# Patient Record
Sex: MALE | Race: WHITE | Employment: FULL TIME | URBAN - METROPOLITAN AREA
[De-identification: names, ages, dates, MRNs, and addresses within clinical notes are randomized per-mention and may not be internally consistent; named-entity substitution may affect disease eponyms.]

---

## 2018-11-04 ENCOUNTER — HOSPITAL ENCOUNTER (EMERGENCY)
Facility: CLINIC | Age: 58
Discharge: HOME | End: 2018-11-04
Attending: FAMILY MEDICINE | Admitting: FAMILY MEDICINE
Payer: COMMERCIAL

## 2018-11-04 ENCOUNTER — APPOINTMENT (OUTPATIENT)
Dept: GENERAL RADIOLOGY | Facility: CLINIC | Age: 58
End: 2018-11-04
Attending: FAMILY MEDICINE
Payer: COMMERCIAL

## 2018-11-04 VITALS
TEMPERATURE: 99.9 F | HEART RATE: 120 BPM | DIASTOLIC BLOOD PRESSURE: 99 MMHG | OXYGEN SATURATION: 95 % | SYSTOLIC BLOOD PRESSURE: 172 MMHG | HEIGHT: 67 IN | RESPIRATION RATE: 18 BRPM
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
## 2018-11-04 DIAGNOSIS — S43.004A DISLOCATION OF RIGHT SHOULDER JOINT, INITIAL ENCOUNTER: ICD-10-CM

## 2018-11-04 LAB
ANION GAP SERPL CALCULATED.3IONS-SCNC: 9 MMOL/L (ref 3–14)
BASOPHILS # BLD AUTO: 0 10E9/L (ref 0–0.2)
BASOPHILS NFR BLD AUTO: 0.4 %
BUN SERPL-MCNC: 17 MG/DL (ref 7–30)
CALCIUM SERPL-MCNC: 8.5 MG/DL (ref 8.5–10.1)
CHLORIDE SERPL-SCNC: 107 MMOL/L (ref 94–109)
CO2 SERPL-SCNC: 26 MMOL/L (ref 20–32)
CREAT SERPL-MCNC: 1.01 MG/DL (ref 0.66–1.25)
DIFFERENTIAL METHOD BLD: NORMAL
EOSINOPHIL # BLD AUTO: 0.2 10E9/L (ref 0–0.7)
EOSINOPHIL NFR BLD AUTO: 1.9 %
ERYTHROCYTE [DISTWIDTH] IN BLOOD BY AUTOMATED COUNT: 12.8 % (ref 10–15)
GFR SERPL CREATININE-BSD FRML MDRD: 76 ML/MIN/1.7M2
GLUCOSE SERPL-MCNC: 93 MG/DL (ref 70–99)
HCT VFR BLD AUTO: 47 % (ref 40–53)
HGB BLD-MCNC: 15.4 G/DL (ref 13.3–17.7)
IMM GRANULOCYTES # BLD: 0 10E9/L (ref 0–0.4)
IMM GRANULOCYTES NFR BLD: 0.4 %
LYMPHOCYTES # BLD AUTO: 2 10E9/L (ref 0.8–5.3)
LYMPHOCYTES NFR BLD AUTO: 24.3 %
MCH RBC QN AUTO: 31 PG (ref 26.5–33)
MCHC RBC AUTO-ENTMCNC: 32.8 G/DL (ref 31.5–36.5)
MCV RBC AUTO: 95 FL (ref 78–100)
MONOCYTES # BLD AUTO: 0.8 10E9/L (ref 0–1.3)
MONOCYTES NFR BLD AUTO: 9.1 %
NEUTROPHILS # BLD AUTO: 5.3 10E9/L (ref 1.6–8.3)
NEUTROPHILS NFR BLD AUTO: 63.9 %
NRBC # BLD AUTO: 0 10*3/UL
NRBC BLD AUTO-RTO: 0 /100
PLATELET # BLD AUTO: 173 10E9/L (ref 150–450)
POTASSIUM SERPL-SCNC: 3.7 MMOL/L (ref 3.4–5.3)
RBC # BLD AUTO: 4.97 10E12/L (ref 4.4–5.9)
SODIUM SERPL-SCNC: 142 MMOL/L (ref 133–144)
WBC # BLD AUTO: 8.3 10E9/L (ref 4–11)

## 2018-11-04 PROCEDURE — 99285 EMERGENCY DEPT VISIT HI MDM: CPT | Mod: 25 | Performed by: FAMILY MEDICINE

## 2018-11-04 PROCEDURE — 96374 THER/PROPH/DIAG INJ IV PUSH: CPT | Performed by: FAMILY MEDICINE

## 2018-11-04 PROCEDURE — 96375 TX/PRO/DX INJ NEW DRUG ADDON: CPT | Performed by: FAMILY MEDICINE

## 2018-11-04 PROCEDURE — 25000128 H RX IP 250 OP 636: Performed by: FAMILY MEDICINE

## 2018-11-04 PROCEDURE — 73060 X-RAY EXAM OF HUMERUS: CPT | Mod: RT

## 2018-11-04 PROCEDURE — 23650 CLTX SHO DSLC W/MNPJ WO ANES: CPT | Mod: RT | Performed by: FAMILY MEDICINE

## 2018-11-04 PROCEDURE — 80048 BASIC METABOLIC PNL TOTAL CA: CPT | Performed by: FAMILY MEDICINE

## 2018-11-04 PROCEDURE — 71045 X-RAY EXAM CHEST 1 VIEW: CPT

## 2018-11-04 PROCEDURE — 85025 COMPLETE CBC W/AUTO DIFF WBC: CPT | Performed by: FAMILY MEDICINE

## 2018-11-04 PROCEDURE — 73030 X-RAY EXAM OF SHOULDER: CPT | Mod: LT

## 2018-11-04 RX ORDER — HYDROMORPHONE HYDROCHLORIDE 1 MG/ML
0.5 INJECTION, SOLUTION INTRAMUSCULAR; INTRAVENOUS; SUBCUTANEOUS
Status: DISCONTINUED | OUTPATIENT
Start: 2018-11-04 | End: 2018-11-04 | Stop reason: HOSPADM

## 2018-11-04 RX ORDER — KETOROLAC TROMETHAMINE 15 MG/ML
15 INJECTION, SOLUTION INTRAMUSCULAR; INTRAVENOUS ONCE
Status: COMPLETED | OUTPATIENT
Start: 2018-11-04 | End: 2018-11-04

## 2018-11-04 RX ADMIN — KETOROLAC TROMETHAMINE 15 MG: 15 INJECTION, SOLUTION INTRAMUSCULAR; INTRAVENOUS at 18:33

## 2018-11-04 RX ADMIN — Medication 0.5 MG: at 18:28

## 2018-11-04 ASSESSMENT — ENCOUNTER SYMPTOMS
CHILLS: 0
HEADACHES: 0
DIARRHEA: 0
WHEEZING: 0
FEVER: 0
ABDOMINAL PAIN: 0
COUGH: 0
DIAPHORESIS: 0
VOMITING: 0
CONSTIPATION: 0
DYSURIA: 0
PALPITATIONS: 0
SHORTNESS OF BREATH: 0
SINUS PRESSURE: 0
SORE THROAT: 0
BLOOD IN STOOL: 0
FREQUENCY: 0
NAUSEA: 0

## 2018-11-04 NOTE — ED AVS SNAPSHOT
[facility] Emergency Department    [address] [address]    Phone:  [phone]    Fax:  [phone]                                       Jaclyn Arango   MRN: [id number]    Department:  [facility] Emergency Department   Date of Visit:  11/4/2018           After Visit Summary Signature Page     I have received my discharge instructions, and my questions have been answered. I have discussed any challenges I see with this plan with the nurse or doctor.    ..........................................................................................................................................  Patient/Patient Representative Signature      ..........................................................................................................................................  Patient Representative Print Name and Relationship to Patient    ..................................................               ................................................  Date                                   Time    ..........................................................................................................................................  Reviewed by Signature/Title    ...................................................              ..............................................  Date                                               Time          22EPIC Rev 08/18

## 2018-11-04 NOTE — ED AVS SNAPSHOT
[facility] Emergency Department    [address] [address]    Phone:  [phone]    Fax:  [phone]                                       Jaclyn Arango   MRN: [id number]    Department:  [facility] Emergency Department   Date of Visit:  11/4/2018           Patient Information     Date Of Birth          1960        Your diagnoses for this visit were:     Fall from ladder, initial encounter     Dislocation of right shoulder joint, initial encounter shoulder immobilizer for at least 3-4 days, and no more than a week.  See sports med in follow-up. pendulum exercises. return for numb, cold, immobile hand/arm       You were seen by Yazan Gonzalez MD.      Follow-up Information     Follow up with Ciaran Yousif MD In 2 weeks.    Specialty:  Family Practice    Why:  As needed    Contact information:    [facility]  [address] [address]  [phone]          Follow up with sports med In 10 days.        Follow up with [facility] Emergency Department.    Specialty:  EMERGENCY MEDICINE    Why:  As needed, If symptoms worsen    Contact information:    [address] [place] [id number] [phone]    Additional information:    The medical center is located at   [address] [place] [address]. (between [address]   [address] in [place], [place] miles north   of [place]).        Discharge Instructions         ICD-10-CM    1. Fall from ladder, initial encounter W11.XXXA ORTHO  REFERRAL   2. Dislocation of right shoulder joint, initial encounter S43.004A ORTHO  REFERRAL     [facility]    shoulder immobilizer for at least 3-4 days, and no more than a week.  See sports med in follow-up. pendulum exercises. return for numb, cold, immobile hand/arm         Dislocation: Shoulder (Reduced)    A shoulder is dislocated when a strong force injures, and possibly tears the ligaments that  hold the shoulder joint together. The bones that make up the joint then move apart and become stuck out of place. The joint must be put back in place. Then it will take several weeks for the shoulder to heal. This injury may weaken the ligaments. Weakened ligaments put you at risk for another dislocation. Another dislocation can happen even if you are hit with less force.  Shoulder dislocation is treated with a special type of arm sling called a shoulder immobilizer. This keeps your arm close to your body. This stops the shoulder from dislocating again while the ligaments heal. After a few weeks, you may start an exercise program. This will gradually bring back your range-of-motion and shoulder strength. It will also lower your risk for another dislocation.  Home care  Follow these tips for taking care of yourself at home:    Until your next doctor visit, wear your shoulder immobilizer at all times. Don t take it off at night to sleep. This is because it s possible to dislocate your arm again in your sleep. You can take it off to bathe or dress. But don t move your arm away from your body. Keep your arm in the same position that the sling was holding it in until you put the sling back on. During your next visit, ask your doctor how long you should wear the sling.    Apply an ice pack over the injured area for 20 minutes every 1 to 2 hours the first day. You can make an ice pack by putting ice cubes in a plastic bag. Wrap the bag in a towel before putting it on your shoulder. Continue with ice packs 3 to 4 times a day for the next 2 to 3 days. Then use the ice as needed to relieve pain and swelling.    You may use acetaminophen or ibuprofen to control pain, unless another pain medicine was prescribed. If you have chronic liver or kidney disease or ever had a stomach ulcer or gastrointestinal bleeding, talk with your doctor before using these medicines.    Don t take part in sports or physical education classes until  your doctor says it s OK.  Follow-up care  Follow up with your healthcare provider, or as advised. You shouldn t wear your shoulder immobilizer or sling for more than a few weeks without taking it off. Keeping it on for a longer time may limit your range-of-motion at the shoulder joint. If you have had several dislocations of the same shoulder, you may have permanent damage to the ligaments. Ask an orthopedic doctor about surgery to prevent another dislocation.  When to seek medical advice  Call your healthcare provider right away if any of these occur:    Another dislocation of your shoulder    Swelling or pain in the shoulder or arm that gets worse    Your fingers become cold, blue, numb or tingly    Fever or chills  Date Last Reviewed: 8/2/2016 2000-2017 [employer]. [address] [place] [address]. All rights reserved. This information is not intended as a substitute for professional medical care. Always follow your healthcare professional's instructions.          24 Hour Appointment Hotline       To make an appointment at any [facility] clinic, call [phone] ([phone]). If you don't have a family doctor or clinic, we will help you find one. [facility] clinics are conveniently located to serve the needs of you and your family.          ED Discharge Orders     [facility] REFERRAL       Physical Therapy, Hand Therapy and Chiropractic Care are available through:  *[facility]  *Hand Therapy (Occupational Therapy or Physical Therapy)  *[facility]    Call one number to schedule at any of the above locations: [phone].    Physical therapy, Hand therapy and/or Chiropractic care has been recommended by your physician as an excellent treatment option to reduce pain and help people return to normal activities, including sports.  Therapy and/or chiropractic care services are a great complement or alternative to  expensive and invasive surgery, injections, or long-term use of prescription medications. The primary goal is to identify the underlying problem and provide you the tools to manage your condition on your own.     Please be aware that coverage of these services is subject to the terms and limitations of your health insurance plan.  Call member services at your health plan with any benefit or coverage questions.      Please bring the following to your appointment:  *Your personal calendar for scheduling future appointments  *Comfortable clothing            ORTHO  REFERRAL       [facility] is referring you to the Orthopedic  Services at [facility].       The  Representative will assist you in the coordination of your Orthopedic and Musculoskeletal Care as prescribed by your physician.    The  Representative will call you within 1 business day to help schedule your appointment, or you may contact the  Representative at:    All areas ~ [phone]     Type of Referral : Non Surgical / Sport Medicine       Timeframe requested: Within 2 weeks    Coverage of these services is subject to the terms and limitations of your health insurance plan.  Please call member services at your health plan with any benefit or coverage questions.      If X-rays, CT or MRI's have been performed, please contact the facility where they were done to arrange for , prior to your scheduled appointment.  Please bring this referral request to your appointment and present it to your specialist.                     Review of your medicines      Notice     You have not been prescribed any medications.            Procedures and tests performed during your visit     Basic metabolic panel    CBC with platelets, differential    Humerus XR, G/E 2 views, right    Shoulder XR, 2 view left    XR Chest 1 View      Orders Needing Specimen Collection     None       Pending Results     No orders found from 11/2/2018 to 11/5/2018.            Pending Culture Results     No orders found from 11/2/2018 to 11/5/2018.            Pending Results Instructions     If you had any lab results that were not finalized at the time of your Discharge, you can call the ED Lab Result RN at [phone]. You will be contacted by this team for any positive Lab results or changes in treatment. The nurses are available 7 days a week from 10A to 6:30P.  You can leave a message 24 hours per day and they will return your call.        Test Results From Your Hospital Stay        11/4/2018  6:43 PM      Component Results     Component Value Ref Range & Units Status    WBC 8.3 4.0 - 11.0 10e9/L Final    RBC Count 4.97 4.4 - 5.9 10e12/L Final    Hemoglobin 15.4 13.3 - 17.7 g/dL Final    Hematocrit 47.0 40.0 - 53.0 % Final    MCV 95 78 - 100 fl Final    MCH 31.0 26.5 - 33.0 pg Final    MCHC 32.8 31.5 - 36.5 g/dL Final    RDW 12.8 10.0 - 15.0 % Final    Platelet Count 173 150 - 450 10e9/L Final    Diff Method Automated Method  Final    % Neutrophils 63.9 % Final    % Lymphocytes 24.3 % Final    % Monocytes 9.1 % Final    % Eosinophils 1.9 % Final    % Basophils 0.4 % Final    % Immature Granulocytes 0.4 % Final    Nucleated RBCs 0 0 /100 Final    Absolute Neutrophil 5.3 1.6 - 8.3 10e9/L Final    Absolute Lymphocytes 2.0 0.8 - 5.3 10e9/L Final    Absolute Monocytes 0.8 0.0 - 1.3 10e9/L Final    Absolute Eosinophils 0.2 0.0 - 0.7 10e9/L Final    Absolute Basophils 0.0 0.0 - 0.2 10e9/L Final    Abs Immature Granulocytes 0.0 0 - 0.4 10e9/L Final    Absolute Nucleated RBC 0.0  Final         11/4/2018  7:03 PM      Component Results     Component Value Ref Range & Units Status    Sodium 142 133 - 144 mmol/L Final    Potassium 3.7 3.4 - 5.3 mmol/L Final    Chloride 107 94 - 109 mmol/L Final    Carbon Dioxide 26 20 - 32 mmol/L Final    Anion Gap 9 3 - 14 mmol/L Final    Glucose 93 70 - 99 mg/dL Final    Urea  Nitrogen 17 7 - 30 mg/dL Final    Creatinine 1.01 0.66 - 1.25 mg/dL Final    GFR Estimate 76 >60 mL/min/1.7m2 Final    Non  GFR Calc    GFR Estimate If Black >90 >60 mL/min/1.7m2 Final    African American GFR Calc    Calcium 8.5 8.5 - 10.1 mg/dL Final         11/4/2018  7:17 PM      Narrative     HUMERUS RIGHT TWO OR MORE VIEWS   11/4/2018 7:00 PM     HISTORY: Proximal humerus fracture when fell from deer stand.     COMPARISON: None.        Impression     IMPRESSION: The humerus is anteriorly and inferiorly subluxed with  respect to the glenoid. There appears to be a Hill-Sachs deformity of  the humeral head. Recommend repeat imaging following relocation of the  shoulder.    PERLA GERMAN MD               11/4/2018  7:17 PM      Narrative     CHEST ONE VIEW   11/4/2018 7:00 PM     HISTORY: Fall from deer stand.     COMPARISON: None.        Impression     IMPRESSION: Cardiomediastinal silhouette within normal limits. No  focal airspace opacities. No pleural effusion. No pneumothorax  identified. The bones are unremarkable.     PERLA GERMAN MD         11/4/2018  7:21 PM      Narrative     SHOULDER TWO VIEW LEFT   11/4/2018 7:13 PM     HISTORY: Right shoulder dislocation, reduced.     COMPARISON: None.        Impression     IMPRESSION: No fracture is visualized on the provided two views.  Humeral head appears normally aligned with the glenoid. No significant  degenerative changes appreciated.    PERLA GERMAN MD                Thank you for choosing [facility]       Thank you for choosing [facility] for your care. Our goal is always to provide you with excellent care. Hearing back from our patients is one way we can continue to improve our services. Please take a few minutes to complete the written survey that you may receive in the mail after you visit with us. Thank you!        [facility] Information     [employer] lets you send messages to your doctor, view your test results, renew your prescriptions,  "schedule appointments and more. To sign up, go to [email or web address] . Click on \"Log in\" on the left side of the screen, which will take you to the Welcome page. Then click on \"Sign up Now\" on the right side of the page.     You will be asked to enter the access code listed below, as well as some personal information. Please follow the directions to create your username and password.     Your access code is: [email or web address]  Expires: 2019  7:46 PM     Your access code will  in 90 days. If you need help or a new code, please call your [facility] clinic or [phone].        Care EveryWhere ID     This is your Care EveryWhere ID. This could be used by other organizations to access your [facility] medical records  [id number]        Equal Access to Services     NASIR NEIL : Yvrose Guerrero, tiburcio lorenzo, mikey zambrano, reggie flannery . So [facility] [phone].    ATENCIÓN: Si habla español, tiene a funk disposición servicios gratuitos de asistencia lingüística. Llame al [phone].    We comply with applicable federal civil rights laws and [place] laws. We do not discriminate on the basis of race, color, national origin, age, disability, sex, sexual orientation, or gender identity.            After Visit Summary       This is your record. Keep this with you and show to your community pharmacist(s) and doctor(s) at your next visit.                  "

## 2018-11-05 NOTE — ED NOTES
Patient states he fell out of a deer stand about 5:00 this evening. The fall was 12-15 feet. He states he landed on his right shoulder. There is a bit of a deformity at the acromion process. He is in pain, there is a radial pulse but the lower half of his arm is cold to touch. This patient is a trauma eval and Dr Gonzalez has been notified. Patient did walk into ED on his own accord.

## 2018-11-05 NOTE — ED PROVIDER NOTES
"  History     Chief Complaint   Patient presents with     Fall     fell out of a deer stand approximetly 12 to 15 feet. c.o right shoulder pain. sore all over     HPI  Jaclyn Arango is a 58 year old male who presents after a fall from deer stand as he was descending approximately 12-15 feet up while stepping down the ladder from the stand.  He notes that he slipped and was able to grab on with the left hand to help break his fall he then landed primarily on his right shoulder.  He notes that he \"saw stars\" -but denies significant head injury and no loss of consciousness.  No neck pain and no pain on range of motion of the neck.  All of his pain is at the right humerus region proximally.  He has no sensory change distally.  There was no bleeding from any site.  He denied other injury and was able to ambulate after this.  No pelvis or hip pain.        History of hypertension, hyperlipidemia    Meds  hctz  aspirin  statin    He uses chewing tobacco.  Occasional alcohol none today.    Problem List:    There are no active problems to display for this patient.       Past Medical History:    No past medical history on file.    Past Surgical History:    No past surgical history on file.    Family History:    No family history on file.    Social History:  Marital Status:  Single [1]  Social History   Substance Use Topics     Smoking status: Not on file     Smokeless tobacco: Not on file     Alcohol use Not on file        Medications:      No current outpatient prescriptions on file.      Review of Systems   Constitutional: Negative for chills, diaphoresis and fever.   HENT: Negative for ear pain, sinus pressure and sore throat.    Eyes: Negative for visual disturbance.   Respiratory: Negative for cough, shortness of breath and wheezing.    Cardiovascular: Negative for chest pain and palpitations.   Gastrointestinal: Negative for abdominal pain, blood in stool, constipation, diarrhea, nausea and vomiting.   Genitourinary: " "Negative for dysuria, frequency and urgency.   Skin: Negative for rash.   Neurological: Negative for headaches.   All other systems reviewed and are negative.         Physical Exam   BP: (!) 172/99  Pulse: 120  Temp: 99.9  F (37.7  C)  Resp: 18  Height: 170.2 cm (5' 7\")  SpO2: 94 %      Physical Exam     Examination:  BP (!) 172/99  Pulse 120  Temp 99.9  F (37.7  C) (Temporal)  Resp 18  Ht 1.702 m (5' 7\")  SpO2 94%   Primary Survey:    Airway: Intact, Patent and Talking    Breathing: Spontaneous, Bilateral breath sounds and Non labored    Circulation: Awake and alert with normal blood pressure and normal central and peripheral perfusion     Disability:     Pupils: EOMI PERRL      GCS:   Motor 6=Obeys commands   Verbal 5=Oriented   Eye Opening 4=Spontaneous   Total: 15          Secondary Survey:    Neurologic: Alert, oriented, and coherent., Motor strength intact in the upper and lower extremities on manual muscle testing. and Sensation intact in the upper and lower extremities    HEENT     Eyes: PERRL, EOMI, lids, lashes, conjunctivae and corneas normal     Head: Atraumatic normocephalic, no areas of erythema, ecchymosis, swelling, deformity or other injury     Ears: Pinnas normal. EACs clear.  TMs normal. No hemotympanum otorrhea     Nose/Sinus: No external injury. No pain or instability on palpation. Nares normal. Septum midline.      Throat/Oropharynx: Lips, tongue, and buccal mucosa intact without evidence of injury. , Teeth intact, Posterior pharynx clear. and Jaw motion normal and without trismus or jaw tendersness. No malocclusion.     Face: No areas of  erythema, ecchymosis, swelling, or deformity.    Neck/C-Spine: Able to focus attention on neck, Full, pain free active range of motion of neck and No tenderness to palpation or percussion over the midline cervical spine    Chest: External Exam - No areas of  erythema, ecchymosis, swelling, or deformity, crepitus or subcutaneous emphysema       Pulmonary: " Breathing unlabored. Breath sounds clear bilaterally with good air entry and no retractions, tachypnea, or adventitious sounds.    Cardiovascular     Heart: Rhythm regular, rate normal, no murmur     Pulses: Bilateral radial normal    Gastrointestinal:     Abdomen: Non-distended, bowel sounds active, soft, non-tender, no hepatosplenomegaly or masses. No areas of  abrasion, laceration, or ecchymosis.     Rectal: Not examined    Genitourinary: Not examined    Musculoskeletal:      Back:  No areas of  erythema, ecchymosis, swelling, or deformity. and No midline tenderness to palpation or percussion over the length of the spine     Extremities:   The right shoulder appears to have a deformity at the proximal humerus that may be a humeral fracture.   Otherwise Full pain-free range of motion of joints of extremties, No areas of  erythema, ecchymosis, swelling, laceration or deformity.         C-spine clearance: C-spine clear by Nexus Criteria (No posterior midline tenderness, No intoxication, Normal alertness, No neuro deficit, No significant distracting injury (able to focus attention on neck))  - This was reevaluated after shoulder was reduced and he maintained normal range of motion of the cervical spine with no midline pain.    GCS prior to Discharge:    Motor 6=Obeys commands   Verbal 5=Oriented   Eye Opening 4=Spontaneous   Total: 15         There was initial concern regarding the right arm.  I was called to urgently evaluate him as it was thought his right arm was cool than the left.  However he has intact distal motor function  with radial ulnar and median nerve function intact.  Normal radial pulse and normal Jorgito's test for ulnar circulation.  Normal distal capillary refill and normal distal sensation.      Initial impression was that the patient had a proximal humerus fracture but once x-ray was obtained was found that he was dislocated at the right shoulder.      ED Course     ED Course     Orthopedic injury  tx  Date/Time: 11/4/2018 10:21 PM  Performed by: MARKELL YEPEZ  Authorized by: MARKELL YEPEZ   Consent: Verbal consent obtained.  Consent given by: patient  Patient understanding: patient states understanding of the procedure being performed  Patient identity confirmed: verbally with patient  Injury location: shoulder  Location details: right shoulder  Injury type: dislocation  Dislocation type: anterior  Hill-Sachs deformity: no  Chronicity: new  Pre-procedure neurovascular assessment: neurovascularly intact  Pre-procedure distal perfusion: normal  Pre-procedure neurological function: normal  Pre-procedure range of motion: normal    Anesthesia:  Local anesthesia used: no    Sedation:  Patient sedated: no  Manipulation performed: yes  Reduction method: davos.  Reduction successful: yes  X-ray confirmed reduction: yes  Immobilization: brace  Post-procedure neurovascular assessment: post-procedure neurovascularly intact  Post-procedure distal perfusion: normal  Post-procedure neurological function: normal  Post-procedure range of motion: normal  Patient tolerance: Patient tolerated the procedure well with no immediate complications                           Critical Care time:  none               Results for orders placed or performed during the hospital encounter of 11/04/18 (from the past 24 hour(s))   CBC with platelets, differential   Result Value Ref Range    WBC 8.3 4.0 - 11.0 10e9/L    RBC Count 4.97 4.4 - 5.9 10e12/L    Hemoglobin 15.4 13.3 - 17.7 g/dL    Hematocrit 47.0 40.0 - 53.0 %    MCV 95 78 - 100 fl    MCH 31.0 26.5 - 33.0 pg    MCHC 32.8 31.5 - 36.5 g/dL    RDW 12.8 10.0 - 15.0 %    Platelet Count 173 150 - 450 10e9/L    Diff Method Automated Method     % Neutrophils 63.9 %    % Lymphocytes 24.3 %    % Monocytes 9.1 %    % Eosinophils 1.9 %    % Basophils 0.4 %    % Immature Granulocytes 0.4 %    Nucleated RBCs 0 0 /100    Absolute Neutrophil 5.3 1.6 - 8.3 10e9/L    Absolute Lymphocytes 2.0 0.8 - 5.3  10e9/L    Absolute Monocytes 0.8 0.0 - 1.3 10e9/L    Absolute Eosinophils 0.2 0.0 - 0.7 10e9/L    Absolute Basophils 0.0 0.0 - 0.2 10e9/L    Abs Immature Granulocytes 0.0 0 - 0.4 10e9/L    Absolute Nucleated RBC 0.0    Basic metabolic panel   Result Value Ref Range    Sodium 142 133 - 144 mmol/L    Potassium 3.7 3.4 - 5.3 mmol/L    Chloride 107 94 - 109 mmol/L    Carbon Dioxide 26 20 - 32 mmol/L    Anion Gap 9 3 - 14 mmol/L    Glucose 93 70 - 99 mg/dL    Urea Nitrogen 17 7 - 30 mg/dL    Creatinine 1.01 0.66 - 1.25 mg/dL    GFR Estimate 76 >60 mL/min/1.7m2    GFR Estimate If Black >90 >60 mL/min/1.7m2    Calcium 8.5 8.5 - 10.1 mg/dL   Humerus XR, G/E 2 views, right    Narrative    HUMERUS RIGHT TWO OR MORE VIEWS   11/4/2018 7:00 PM     HISTORY: Proximal humerus fracture when fell from deer stand.     COMPARISON: None.      Impression    IMPRESSION: The humerus is anteriorly and inferiorly subluxed with  respect to the glenoid. There appears to be a Hill-Sachs deformity of  the humeral head. Recommend repeat imaging following relocation of the  shoulder.    PERLA GERMAN MD   XR Chest 1 View    Narrative    CHEST ONE VIEW   11/4/2018 7:00 PM     HISTORY: Fall from deer stand.     COMPARISON: None.      Impression    IMPRESSION: Cardiomediastinal silhouette within normal limits. No  focal airspace opacities. No pleural effusion. No pneumothorax  identified. The bones are unremarkable.     PERLA GERMAN MD   Shoulder XR, 2 view left    Narrative    SHOULDER TWO VIEW LEFT   11/4/2018 7:13 PM     HISTORY: Right shoulder dislocation, reduced.     COMPARISON: None.      Impression    IMPRESSION: No fracture is visualized on the provided two views.  Humeral head appears normally aligned with the glenoid. No significant  degenerative changes appreciated.    PERLA GERMAN MD       Medications   ketorolac (TORADOL) injection 15 mg (not administered)   HYDROmorphone (PF) (DILAUDID) injection 0.5 mg (not administered)        Assessments & Plan (with Medical Decision Making)     I have reviewed the nursing notes.    MDM: Jaclyn Arango is a 58 year old male who presented after a fall from a deer stand landing on his right side and with injury to the right shoulder.  At his initial presentation I was concerned he may have a right proximal humerus fracture but on x-ray the humerus is intact and his findings are most consistent with right shoulder dislocation.  He has intact distal motor function sensation capillary refill and pulses.  There was some coolness initially to the right hand compared to the left this was mild and again neurovascular status appeared to be intact.  On his return from x-ray he was placed in Davos position.  He had already received some pain medications.  He immediately relocated.  He then dislocated again while he was moving his shoulder and spontaneously relocated.  This happened a third time before his shoulder immobilizer was in place.  X-ray did confirm reduction.  I am concerned that he has significant instability but we discussed that leaving him in the shoulder immobilizer for too long will result in a frozen shoulder.  Therefore have made additional recommendations as below   Including using the  shoulder immobilizer for less than a week and likely closer to 1/2-week and working some pendulum exercises without overhead use.  He may continue to do his job as an  but may have limited use of his right hand.  Cautions are given for return.  I placed a consult for both sports medicine follow-up and for physical therapy follow-up.    On trauma examination I found no other significant findings despite fall from height..     I have reviewed the findings, diagnosis, plan and need for follow up with the patient.       New Prescriptions    No medications on file       Final diagnoses:   Fall from ladder, initial encounter   Dislocation of right shoulder joint, initial encounter - shoulder immobilizer for  at least 3-4 days, and no more than a week.  See sports med in follow-up. pendulum exercises. return for numb, cold, immobile hand/arm       11/4/2018   [facility] EMERGENCY DEPARTMENT     Yazan Gonzalez MD  11/04/18 2220       Yazan Gonzalez MD  11/04/18 2224

## 2018-11-05 NOTE — ED NOTES
Discharge instructions were reviewed with pt and pt's wife. Pt was ambulatory from the ER and expressed understanding of using his shoulder immobilizer.

## 2018-11-05 NOTE — ED NOTES
Patient states he does not feel he injured any other area of his body.He did not lose LOC and stated he walked to where his son was after the fall.

## 2018-11-05 NOTE — DISCHARGE INSTRUCTIONS
ICD-10-CM    1. Fall from ladder, initial encounter W11.XXXA ORTHO  REFERRAL   2. Dislocation of right shoulder joint, initial encounter S43.004A ORTHO  REFERRAL     [facility]    shoulder immobilizer for at least 3-4 days, and no more than a week.  See sports med in follow-up. pendulum exercises. return for numb, cold, immobile hand/arm         Dislocation: Shoulder (Reduced)    A shoulder is dislocated when a strong force injures, and possibly tears the ligaments that hold the shoulder joint together. The bones that make up the joint then move apart and become stuck out of place. The joint must be put back in place. Then it will take several weeks for the shoulder to heal. This injury may weaken the ligaments. Weakened ligaments put you at risk for another dislocation. Another dislocation can happen even if you are hit with less force.  Shoulder dislocation is treated with a special type of arm sling called a shoulder immobilizer. This keeps your arm close to your body. This stops the shoulder from dislocating again while the ligaments heal. After a few weeks, you may start an exercise program. This will gradually bring back your range-of-motion and shoulder strength. It will also lower your risk for another dislocation.  Home care  Follow these tips for taking care of yourself at home:    Until your next doctor visit, wear your shoulder immobilizer at all times. Don t take it off at night to sleep. This is because it s possible to dislocate your arm again in your sleep. You can take it off to bathe or dress. But don t move your arm away from your body. Keep your arm in the same position that the sling was holding it in until you put the sling back on. During your next visit, ask your doctor how long you should wear the sling.    Apply an ice pack over the injured area for 20 minutes every 1 to 2 hours the first day. You can make an ice pack by putting ice cubes in  a plastic bag. Wrap the bag in a towel before putting it on your shoulder. Continue with ice packs 3 to 4 times a day for the next 2 to 3 days. Then use the ice as needed to relieve pain and swelling.    You may use acetaminophen or ibuprofen to control pain, unless another pain medicine was prescribed. If you have chronic liver or kidney disease or ever had a stomach ulcer or gastrointestinal bleeding, talk with your doctor before using these medicines.    Don t take part in sports or physical education classes until your doctor says it s OK.  Follow-up care  Follow up with your healthcare provider, or as advised. You shouldn t wear your shoulder immobilizer or sling for more than a few weeks without taking it off. Keeping it on for a longer time may limit your range-of-motion at the shoulder joint. If you have had several dislocations of the same shoulder, you may have permanent damage to the ligaments. Ask an orthopedic doctor about surgery to prevent another dislocation.  When to seek medical advice  Call your healthcare provider right away if any of these occur:    Another dislocation of your shoulder    Swelling or pain in the shoulder or arm that gets worse    Your fingers become cold, blue, numb or tingly    Fever or chills  Date Last Reviewed: 8/2/2016 2000-2017 [employer]. [address] [place] [address], [place] [address]. All rights reserved. This information is not intended as a substitute for professional medical care. Always follow your healthcare professional's instructions.

## 2021-04-21 ENCOUNTER — IMMUNIZATION (OUTPATIENT)
Dept: FAMILY MEDICINE | Facility: CLINIC | Age: 61
End: 2021-04-21
Payer: COMMERCIAL

## 2021-04-21 PROCEDURE — 0011A PR COVID VAC MODERNA 100 MCG/0.5 ML IM: CPT

## 2021-04-21 PROCEDURE — 91301 PR COVID VAC MODERNA 100 MCG/0.5 ML IM: CPT

## 2021-05-19 ENCOUNTER — IMMUNIZATION (OUTPATIENT)
Dept: FAMILY MEDICINE | Facility: CLINIC | Age: 61
End: 2021-05-19
Attending: FAMILY MEDICINE
Payer: COMMERCIAL

## 2021-05-19 PROCEDURE — 0012A PR COVID VAC MODERNA 100 MCG/0.5 ML IM: CPT

## 2021-05-19 PROCEDURE — 91301 PR COVID VAC MODERNA 100 MCG/0.5 ML IM: CPT

## 2021-05-30 ENCOUNTER — HEALTH MAINTENANCE LETTER (OUTPATIENT)
Age: 61
End: 2021-05-30

## 2021-09-19 ENCOUNTER — HEALTH MAINTENANCE LETTER (OUTPATIENT)
Age: 61
End: 2021-09-19

## 2022-06-26 ENCOUNTER — HEALTH MAINTENANCE LETTER (OUTPATIENT)
Age: 62
End: 2022-06-26

## 2022-11-21 ENCOUNTER — HEALTH MAINTENANCE LETTER (OUTPATIENT)
Age: 62
End: 2022-11-21

## 2023-07-08 ENCOUNTER — HEALTH MAINTENANCE LETTER (OUTPATIENT)
Age: 63
End: 2023-07-08